# Patient Record
Sex: FEMALE | Race: WHITE | HISPANIC OR LATINO | URBAN - METROPOLITAN AREA
[De-identification: names, ages, dates, MRNs, and addresses within clinical notes are randomized per-mention and may not be internally consistent; named-entity substitution may affect disease eponyms.]

---

## 2017-01-12 ENCOUNTER — IMPORTED ENCOUNTER (OUTPATIENT)
Dept: URBAN - METROPOLITAN AREA CLINIC 50 | Facility: CLINIC | Age: 67
End: 2017-01-12

## 2017-01-12 NOTE — PATIENT DISCUSSION
"""Recommended OTC artificial tears two - four times a day as needed. "" ""Restasis ophthalmic emulsion prescribed for dry eye management

## 2017-02-17 ENCOUNTER — IMPORTED ENCOUNTER (OUTPATIENT)
Dept: URBAN - METROPOLITAN AREA CLINIC 50 | Facility: CLINIC | Age: 67
End: 2017-02-17

## 2017-02-28 ENCOUNTER — IMPORTED ENCOUNTER (OUTPATIENT)
Dept: URBAN - METROPOLITAN AREA CLINIC 50 | Facility: CLINIC | Age: 67
End: 2017-02-28

## 2019-12-09 ENCOUNTER — IMPORTED ENCOUNTER (OUTPATIENT)
Dept: URBAN - METROPOLITAN AREA CLINIC 50 | Facility: CLINIC | Age: 69
End: 2019-12-09

## 2020-03-12 ENCOUNTER — IMPORTED ENCOUNTER (OUTPATIENT)
Dept: URBAN - METROPOLITAN AREA CLINIC 50 | Facility: CLINIC | Age: 70
End: 2020-03-12

## 2021-04-17 ASSESSMENT — TONOMETRY
OS_IOP_MMHG: 13
OS_IOP_MMHG: 14
OS_IOP_MMHG: 12
OD_IOP_MMHG: 15
OD_IOP_MMHG: 12
OD_IOP_MMHG: 13
OS_IOP_MMHG: 14

## 2021-04-17 ASSESSMENT — VISUAL ACUITY
OD_SC: 20/40
OS_BAT: 60
OS_SC: 20/30
OD_BAT: 70
OS_CC: J1+
OD_CC: J1+@ 17 IN
OS_SC: 20/30+2
OD_SC: 20/30+2
OS_SC: 20/30
OS_CC: J1+@ 17 IN
OD_SC: 20/30
OS_SC: 20/25-1
OD_SC: 20/30-1
OS_CC: 20/40
OD_CC: J1+

## 2025-08-06 ENCOUNTER — NEW PATIENT (OUTPATIENT)
Age: 75
End: 2025-08-06

## 2025-08-06 DIAGNOSIS — H43.811: ICD-10-CM

## 2025-08-06 DIAGNOSIS — H04.123: ICD-10-CM

## 2025-08-06 DIAGNOSIS — H57.13: ICD-10-CM

## 2025-08-06 DIAGNOSIS — H10.13: ICD-10-CM

## 2025-08-06 DIAGNOSIS — H52.4: ICD-10-CM

## 2025-08-06 PROCEDURE — 92015 DETERMINE REFRACTIVE STATE: CPT

## 2025-08-06 PROCEDURE — 99204 OFFICE O/P NEW MOD 45 MIN: CPT

## 2025-08-06 PROCEDURE — 92134 CPTRZ OPH DX IMG PST SGM RTA: CPT

## 2025-08-06 RX ORDER — OLOPATADINE HYDROCHLORIDE 2 MG/ML
1 SOLUTION OPHTHALMIC ONCE A DAY
Start: 2025-08-06